# Patient Record
Sex: FEMALE | Race: WHITE | URBAN - METROPOLITAN AREA
[De-identification: names, ages, dates, MRNs, and addresses within clinical notes are randomized per-mention and may not be internally consistent; named-entity substitution may affect disease eponyms.]

---

## 2019-09-04 ENCOUNTER — OFFICE VISIT (OUTPATIENT)
Dept: FAMILY MEDICINE CLINIC | Age: 29
End: 2019-09-04

## 2019-09-04 VITALS
OXYGEN SATURATION: 100 % | TEMPERATURE: 98.1 F | DIASTOLIC BLOOD PRESSURE: 90 MMHG | HEIGHT: 64 IN | RESPIRATION RATE: 18 BRPM | BODY MASS INDEX: 23.56 KG/M2 | SYSTOLIC BLOOD PRESSURE: 129 MMHG | WEIGHT: 138 LBS | HEART RATE: 87 BPM

## 2019-09-04 DIAGNOSIS — H66.90 ACUTE OTITIS MEDIA, UNSPECIFIED OTITIS MEDIA TYPE: ICD-10-CM

## 2019-09-04 DIAGNOSIS — H92.02 LEFT EAR PAIN: Primary | ICD-10-CM

## 2019-09-04 DIAGNOSIS — H60.502 ACUTE OTITIS EXTERNA OF LEFT EAR, UNSPECIFIED TYPE: ICD-10-CM

## 2019-09-04 DIAGNOSIS — H61.22 IMPACTED CERUMEN OF LEFT EAR: ICD-10-CM

## 2019-09-04 RX ORDER — CIPROFLOXACIN AND DEXAMETHASONE 3; 1 MG/ML; MG/ML
4 SUSPENSION/ DROPS AURICULAR (OTIC) 2 TIMES DAILY
Qty: 7.5 ML | Refills: 0 | Status: SHIPPED | OUTPATIENT
Start: 2019-09-04 | End: 2019-09-06 | Stop reason: ALTCHOICE

## 2019-09-04 RX ORDER — LORATADINE 10 MG/1
10 TABLET ORAL
COMMUNITY

## 2019-09-04 RX ORDER — NORETHINDRONE ACETATE AND ETHINYL ESTRADIOL 1MG-20(21)
KIT ORAL
COMMUNITY

## 2019-09-04 RX ORDER — AMOXICILLIN 500 MG/1
500 CAPSULE ORAL 2 TIMES DAILY
Qty: 20 CAP | Refills: 0 | Status: SHIPPED | OUTPATIENT
Start: 2019-09-04 | End: 2019-09-14

## 2019-09-04 NOTE — PROGRESS NOTES
HISTORY OF PRESENT ILLNESS  Beth Coral Osler is a 34 y.o. female. TRINA  Jahaira Sanderson is a 34 y.o. female who presents to the office today for ear pain. She is a new patient here to establish care. Significant past medical history. She comes in for left ear pain. This started 2 weeks ago. There is pain and ringing in the left ear. She thought this was due to cerumen impaction so she used an over-the-counter irrigation system. She states since that the pain and ringing has worsened. She denies fevers, sore throat, or nasal congestion. She does have seasonal allergies and takes Claritin for this. Chief Complaint   Patient presents with    Ear Pain       Current Outpatient Medications on File Prior to Visit   Medication Sig Dispense Refill    loratadine (CLARITIN) 10 mg tablet Take 10 mg by mouth.  norethindrone-ethinyl estradiol (JUNEL FE 1/20, 28,) 1 mg-20 mcg (21)/75 mg (7) tab Take  by mouth. No current facility-administered medications on file prior to visit. No Known Allergies  No past medical history on file. Social History     Tobacco Use   Smoking Status Never Smoker   Smokeless Tobacco Never Used     Social History     Substance and Sexual Activity   Alcohol Use Yes     Family History   Problem Relation Age of Onset    Hypertension Father     Cancer Maternal Grandmother         breast     Review of Systems   Constitutional: Negative for chills and fever. HENT: Positive for ear pain and tinnitus. Negative for congestion, ear discharge and sore throat. Respiratory: Negative for cough. Neurological: Negative for dizziness and headaches. Endo/Heme/Allergies: Positive for environmental allergies.      Visit Vitals  /90 (BP 1 Location: Right arm, BP Patient Position: Sitting)   Pulse 87   Temp 98.1 °F (36.7 °C) (Oral)   Resp 18   Ht 5' 4\" (1.626 m)   Wt 138 lb (62.6 kg)   LMP 09/04/2019 (Exact Date)   SpO2 100%   BMI 23.69 kg/m²       Physical Exam Constitutional: She is oriented to person, place, and time. She appears well-developed and well-nourished. No distress. HENT:   Head: Atraumatic. Right Ear: External ear and ear canal normal.   Left Ear: External ear normal.   Nose: Nose normal.   Mouth/Throat: Oropharynx is clear and moist. No oropharyngeal exudate. Bilateral cerumen impaction. Left ear canal appears red and irritated, but cannot visualize much   Neck: Neck supple. Swollen left ant cervical lymph node   Cardiovascular: Normal rate, regular rhythm and normal heart sounds. Pulmonary/Chest: Effort normal and breath sounds normal.   Lymphadenopathy:     She has cervical adenopathy. Neurological: She is alert and oriented to person, place, and time. Psychiatric: She has a normal mood and affect. Her behavior is normal.   Nursing note and vitals reviewed. Cerumen Disimpaction Procedure Note:  Verbal consent obtained for cerumen disimpaction. Risks, benefits and complications discussed and patient verbalizes understanding or risk and requests procedure. 2 Debrox drops were placed in affected ears. Cerumen disimpacted with lavage. TMs now visible. Left TM bulging and red. Left ear canal red and inflammed. Patient tolerated procedure well without complications. ASSESSMENT and PLAN    ICD-10-CM ICD-9-CM    1. Left ear pain H92.02 388.70 REMOVAL IMPACTED CERUMEN IRRIGATION/LVG UNILAT      amoxicillin (AMOXIL) 500 mg capsule      ciprofloxacin-dexamethasone (CIPRODEX) 0.3-0.1 % otic suspension   2. Impacted cerumen of left ear H61.22 380.4 REMOVAL IMPACTED CERUMEN IRRIGATION/LVG UNILAT   3. Acute otitis externa of left ear, unspecified type H60.502 380.10 amoxicillin (AMOXIL) 500 mg capsule      ciprofloxacin-dexamethasone (CIPRODEX) 0.3-0.1 % otic suspension   4.  Acute otitis media, unspecified otitis media type H66.90 382.9 amoxicillin (AMOXIL) 500 mg capsule      ciprofloxacin-dexamethasone (CIPRODEX) 0.3-0.1 % otic suspension Once cerumen was removed, left ear showed OM and OE. She will finish entire course of abx and drops. If symptoms do not resolve after course of treatment, RTC. Reviewed medication and side effects. Patient agrees with the plan and verbalizes understanding. Follow-up and Dispositions    · Return if symptoms worsen or fail to improve.        Amber Del Rosario PA-C  9/4/2019

## 2019-09-04 NOTE — PATIENT INSTRUCTIONS
Earache: Care Instructions  Your Care Instructions    Even though infection is a common cause of ear pain, not all ear pain means an infection. If you have ear pain and don't have an infection, it could be because of a jaw problem, such as temporomandibular joint (TMJ) pain. Or it could be because of a neck problem. When ear discomfort or pain is mild or comes and goes without other symptoms, home treatment may be all you need. Follow-up care is a key part of your treatment and safety. Be sure to make and go to all appointments, and call your doctor if you are having problems. It's also a good idea to know your test results and keep a list of the medicines you take. How can you care for yourself at home? · Apply heat on the ear to ease pain. To apply heat, put a warm water bottle, a heating pad set on low, or a warm cloth on your ear. Do not go to sleep with a heating pad on your skin. · Take an over-the-counter pain medicine, such as acetaminophen (Tylenol), ibuprofen (Advil, Motrin), or naproxen (Aleve). Be safe with medicines. Read and follow all instructions on the label. · Do not take two or more pain medicines at the same time unless the doctor told you to. Many pain medicines have acetaminophen, which is Tylenol. Too much acetaminophen (Tylenol) can be harmful. · Never insert anything, such as a cotton swab or a genoveva pin, into the ear. When should you call for help? Call your doctor now or seek immediate medical care if:    · You have new or worse symptoms of infection, such as:  ? Increased pain, swelling, warmth, or redness. ? Red streaks leading from the area. ? Pus draining from the area. ? A fever.    Watch closely for changes in your health, and be sure to contact your doctor if:    · You have new or worse discharge coming from the ear.     · You do not get better as expected. Where can you learn more? Go to http://kiko-vesta.info/.   Enter K418 in the search box to learn more about \"Earache: Care Instructions. \"  Current as of: October 21, 2018  Content Version: 12.1  © 3779-6719 [employer]. Care instructions adapted under license by [employer] (which disclaims liability or warranty for this information). If you have questions about a medical condition or this instruction, always ask your healthcare professional. [address] any warranty or liability for your use of this information.

## 2019-09-04 NOTE — PROGRESS NOTES
Christopher Doll is a 34 y.o. female here today as a new patient to our practice. She is here with c/o left ear pain that she first noticed 2 weeks ago. She states there is ringing and a feeling of fullness. She tried to de-wax her ear bit that made it worse. She does not have a previous PCP but got her annual check ups at [facility]. Patient declined the flu shot.

## 2019-09-05 ENCOUNTER — TELEPHONE (OUTPATIENT)
Dept: FAMILY MEDICINE CLINIC | Age: 29
End: 2019-09-05

## 2019-09-05 NOTE — TELEPHONE ENCOUNTER
Patient is calling requesting to have a generic medication for the Ciprodex ear drops. Patient states that the medication that has been called in is to expensive.

## 2019-09-06 DIAGNOSIS — H60.502 ACUTE OTITIS EXTERNA OF LEFT EAR, UNSPECIFIED TYPE: Primary | ICD-10-CM
